# Patient Record
Sex: FEMALE | Race: WHITE | NOT HISPANIC OR LATINO | Employment: OTHER | URBAN - METROPOLITAN AREA
[De-identification: names, ages, dates, MRNs, and addresses within clinical notes are randomized per-mention and may not be internally consistent; named-entity substitution may affect disease eponyms.]

---

## 2022-08-04 PROBLEM — I10 HYPERTENSION: Status: ACTIVE | Noted: 2022-08-04

## 2022-08-04 PROBLEM — J44.9 COPD (CHRONIC OBSTRUCTIVE PULMONARY DISEASE) (HCC): Status: ACTIVE | Noted: 2022-08-04

## 2022-08-04 PROBLEM — C44.91 BASAL CELL CARCINOMA: Status: ACTIVE | Noted: 2022-06-10

## 2022-08-05 ENCOUNTER — CLINICAL SUPPORT (OUTPATIENT)
Dept: RADIATION ONCOLOGY | Facility: CLINIC | Age: 87
End: 2022-08-05
Attending: RADIOLOGY
Payer: MEDICARE

## 2022-08-05 VITALS
HEIGHT: 65 IN | RESPIRATION RATE: 16 BRPM | HEART RATE: 80 BPM | DIASTOLIC BLOOD PRESSURE: 88 MMHG | TEMPERATURE: 97.7 F | SYSTOLIC BLOOD PRESSURE: 142 MMHG | OXYGEN SATURATION: 93 % | WEIGHT: 129.41 LBS | BODY MASS INDEX: 21.56 KG/M2

## 2022-08-05 DIAGNOSIS — C44.311 BASAL CELL CARCINOMA (BCC) OF SKIN OF NOSE: Primary | ICD-10-CM

## 2022-08-05 DIAGNOSIS — C44.311 BASAL CELL CARCINOMA (BCC) OF RIGHT SIDE OF NOSE: ICD-10-CM

## 2022-08-05 PROCEDURE — 99211 OFF/OP EST MAY X REQ PHY/QHP: CPT | Performed by: RADIOLOGY

## 2022-08-05 PROCEDURE — 99204 OFFICE O/P NEW MOD 45 MIN: CPT | Performed by: RADIOLOGY

## 2022-08-05 NOTE — PROGRESS NOTES
Consultation - Radiation Oncology      STW:[id number] : 1930  Encounter: [id number]  Patient Information: [address] [place] [address]  Chief Complaint   Patient presents with    Consult     Cancer Staging  No matching staging information was found for the patient  History of Present Illness     Genevia Fleischer 1930 is a 80 y o  female Is seen today to discuss radiation for basal cell carcinoma of the nose  Referred by Dr Kindra Henry       Patient presented to [facility], [facility] on 6/10/22  Seen by Dr Chun Chapin for growth on the right side of her nose  Exam showed 1 5 cm pearly nodule on the right nasal labial fold and defined pearly plaque, 1 3 cm on the left distal nose  Shave biopsies were performed of these 2 areas revealing basal cell carcinoma          6/10/22 OUTSIDE PATHOLOGY - slides requested     1  Skin, Right nasal labial fold, shave: Basal cell carcinoma, nodular type, extending to the reticular dermis and deep margin (pTx,Mx)     2  Skin, Left distal nose, Shave: nodular and infiltrative types, extending to the deep margin (pTxMx)        Patient currently resides at [facility]   Oncology History   Basal cell carcinoma   6/10/2022 Initial Diagnosis    Basal cell carcinoma     6/10/2022 Biopsy    OUTSIDE PATHOLOGY    1  Skin, Right nasal labial fold, shave: Basal cell carcinoma, nodular type, extending to the reticular dermis and deep margin (pTx,Mx)    2   Skin, Left distal nose, Shave: nodular and infiltrative types, extending to the deep margin (pTxMx)           Past Medical History:   Diagnosis Date    Basal cell carcinoma 06/10/2022    COPD (chronic obstructive pulmonary disease) (HCC)     Diverticulitis     Hypertension      Past Surgical History:   Procedure Laterality Date    ABDOMINAL AORTIC ANEURYSM REPAIR      APPENDECTOMY      BOWEL RESECTION       SECTION      x2    TONSILLECTOMY      TOTAL HIP ARTHROPLASTY Right        Family History   Problem Relation Age of Onset    Skin cancer Son         [facility]       Social History   Social History     Substance and Sexual Activity   Alcohol Use Yes    Comment: 5-7 glasses of rum/lemonade per week     Social History     Substance and Sexual Activity   Drug Use Never     Social History     Tobacco Use   Smoking Status Former Smoker   Smokeless Tobacco Never Used         Meds/Allergies     Current Outpatient Medications:     ASPIRIN 81 PO, Take by mouth in the morning, Disp: , Rfl:     clopidogrel (PLAVIX) 75 mg tablet, Take 75 mg by mouth daily, Disp: , Rfl:     lisinopril (ZESTRIL) 5 mg tablet, Take 5 mg by mouth daily, Disp: , Rfl:   Allergies   Allergen Reactions    Bactrim [Sulfamethoxazole-Trimethoprim] Diarrhea and Other (See Comments)     Gave her diarrhea with C Diff    Clindamycin Diarrhea    Oxycodone Vomiting    Levaquin [Levofloxacin] Rash    Vancomycin Rash         Review of Systems   Constitutional: Positive for appetite change (feels she doesn get appetizing food at the assisted living facility)  HENT: Positive for hearing loss  Eyes: Negative  Wears glasses   Respiratory: Negative for shortness of breath  Uses oxygen 3-4 L continuous     Gastrointestinal: Negative  Endocrine: Negative  Genitourinary: Negative  Musculoskeletal: Positive for gait problem (unsteady, uses walker in her room, and wheelchair for distance)  Skin:        Lesion noted at right nasal labial fold   Allergic/Immunologic: Negative  Negative for environmental allergies and food allergies  Hematological: Negative      Psychiatric/Behavioral: Negative            OBJECTIVE:   /88 (BP Location: Right arm, Patient Position: Sitting)   Pulse 80   Temp 97 7 °F (36 5 °C) (Temporal)   Resp 16   Ht 5' 5" (1 651 m)   Wt 58 7 kg (129 lb 6 6 oz)   SpO2 93% Comment: 4 L/min  BMI 21 53 kg/m²   Pain Assessment:  0  Performance Status: ECOG/Zubrod/WHO: 2 - Symptomatic, <50% confined to bed    Physical Exam   She has a protruding lesion measuring 2 cm in the region of her right nasolabial fold extending into her right medial cheek  There is a central cavitation with raised borders  There is an area measuring approximately 1 5 cm at the tip of her nose just flush at the site of her biopsy  No parotid or cervical adenopathy noted  She is hard of hearing however converses appropriately  She requires 3-4 L of nasal oxygen  She is here in a wheelchair      RESULTS  Lab Results    Chemistry    No results found for: NA, K, CL, CO2, BUN, CREATININE No results found for: CALCIUM, ALKPHOS, AST, ALT, BILITOT         No results found for: WBC, HGB, HCT, MCV, PLT      Imaging Studies  No results found  Pathology:  Basal cell CA from the right nasal Ala as well as the left distal nose        ASSESSMENT  1  Basal cell carcinoma (BCC) of skin of nose     2  Basal cell carcinoma (BCC) of right side of nose  Ambulatory Referral to Radiation Oncology     Cancer Staging  No matching staging information was found for the patient  PLAN/DISCUSSION  No orders of the defined types were placed in this encounter  Awilda Pineda is a 80y o  year old female with 2 basal cell carcinomas on her nose  The lesion the right nasal labial fold extending to the inner cheek is larger in size and had been bleeding  There is no current discharge  Second biopsy proven basal cell on the tip of her nose  She is here with her son today and we discussed treatment options  He has already met with the Mohs surgeon who does not feel she is a good surgical candidate for Mohs or excision which would require graft  As these areas would encompass a larger field in a sensitive area along with presence of cartilage she would be best served with more standard fractionation be to minimize side effects    The patient also is on a blood thinner and has states that it has been difficult to control her bleeding from the lesion in the past   We discussed a course of definitive electron beam radiation treatment  We discussed the goal of treatment which is curative  In addition possible side effects which can include fatigue, skin erythema, desquamation, nasal congestion type sensation, mucosal irritation including nose bleeds  We discussed the use of nasal inserts to minimize mucosal side effects  As she is oxygen dependent at the time of simulation will construct a device for her to receive oral oxygenation  They would like to proceed with treatment and CT simulation has been scheduled for her  Leland Hunt MD  8/5/2022,3:12 PM      Portions of the record may have been created with voice recognition software  Occasional wrong word or "sound a like" substitutions may have occurred due to the inherent limitations of voice recognition software  Read the chart carefully and recognize, using context, where substitutions have occurred

## 2022-08-05 NOTE — PROGRESS NOTES
Suzanne Hebert 9/21/1930 is a 80 y o  female Is seen today to discuss radiation for basal cell carcinoma of the nose  Referred by Dr Kera Jones  Patient presented to [facility], [facility] on 6/10/22  Seen by Dr Radha De Leon for growth on the right side of her nose  Exam showed 1 5 cm pearly nodule on the right nasal labial fold and defined pearly plaque, 1 3 cm on the left distal nose  Shave biopsies were performed of these 2 areas revealing basal cell carcinoma  6/10/22 OUTSIDE PATHOLOGY - slides requested    1  Skin, Right nasal labial fold, shave: Basal cell carcinoma, nodular type, extending to the reticular dermis and deep margin (pTx,Mx)    2  Skin, Left distal nose, Shave: nodular and infiltrative types, extending to the deep margin (pTxMx)      Patient currently resides at [facility]   Basal cell carcinoma   6/10/2022 Initial Diagnosis    Basal cell carcinoma     6/10/2022 Biopsy    OUTSIDE PATHOLOGY    2  Skin, Right nasal labial fold, shave: Basal cell carcinoma, nodular type, extending to the reticular dermis and deep margin (pTx,Mx)    2  Skin, Left distal nose, Shave: nodular and infiltrative types, extending to the deep margin (pTxMx)         Review of Systems:  Review of Systems   Constitutional: Positive for appetite change (feels she doesn get appetizing food at the assisted living facility)  HENT: Positive for hearing loss  Eyes: Negative  Wears glasses   Respiratory: Negative for shortness of breath  Uses oxygen 3-4 L continuous     Gastrointestinal: Negative  Endocrine: Negative  Genitourinary: Negative  Musculoskeletal: Positive for gait problem (unsteady, uses walker in her room, and wheelchair for distance)  Skin:        Lesion noted at right nasal labial fold   Allergic/Immunologic: Negative  Negative for environmental allergies and food allergies  Hematological: Negative  Psychiatric/Behavioral: Negative  Clinical Trial: no    Pregnancy test needed:  no    ONCOTYPE/MAMMOPRINT results: n/a    PFT: n/a    Prior Radiation: No     Teaching: NCI RT packet given; RT for BCC of the nose reviewed with patient and her son, Rachelle Barrera    MST: completed    Implantable Devices (Port, Pacemaker, pain stimulator): No     Hip Replacement: R hip     Covid Vaccine Status:  Up to date    [unfilled]  Health Maintenance   Topic Date Due    Medicare Annual Wellness Visit (AWV)  Never done    COVID-19 Vaccine (1) Never done    Pneumococcal Vaccine: 65+ Years (1 - PCV) Never done    Depression Screening  Never done    BMI: Adult  Never done    Fall Risk  Never done    Influenza Vaccine (1) 09/01/2022    HIB Vaccine  Aged Out    Hepatitis B Vaccine  Aged Out    IPV Vaccine  Aged Out    Hepatitis A Vaccine  Aged Out    Meningococcal ACWY Vaccine  Aged Out    HPV Vaccine  Aged Out     Past Medical History:   Diagnosis Date    Basal cell carcinoma 06/10/2022    COPD (chronic obstructive pulmonary disease) ([facility] [address] )     Hypertension      Past Surgical History:   Procedure Laterality Date    ABDOMINAL AORTIC ANEURYSM REPAIR       No family history on file  Current Outpatient Medications:     ASPIRIN 81 PO, Take by mouth in the morning, Disp: , Rfl:     clopidogrel (PLAVIX) 75 mg tablet, Take 75 mg by mouth daily, Disp: , Rfl:     lisinopril (ZESTRIL) 5 mg tablet, Take 5 mg by mouth daily, Disp: , Rfl:   Not on File   There were no vitals filed for this visit

## 2022-08-10 ENCOUNTER — TELEPHONE (OUTPATIENT)
Dept: RADIATION ONCOLOGY | Facility: CLINIC | Age: 87
End: 2022-08-10

## 2022-09-13 ENCOUNTER — APPOINTMENT (OUTPATIENT)
Dept: RADIOLOGY | Facility: MEDICAL CENTER | Age: 87
End: 2022-09-13
Payer: MEDICARE

## 2022-09-13 DIAGNOSIS — M79.89 SWELLING OF RIGHT FOOT: ICD-10-CM

## 2022-09-13 DIAGNOSIS — M79.671 PAIN OF RIGHT FOOT: ICD-10-CM

## 2022-09-13 PROCEDURE — 73630 X-RAY EXAM OF FOOT: CPT

## 2022-09-15 ENCOUNTER — LAB REQUISITION (OUTPATIENT)
Dept: LAB | Facility: HOSPITAL | Age: 87
End: 2022-09-15
Payer: COMMERCIAL

## 2022-09-15 DIAGNOSIS — D64.9 ANEMIA, UNSPECIFIED: ICD-10-CM

## 2022-09-15 DIAGNOSIS — N18.9 CHRONIC KIDNEY DISEASE, UNSPECIFIED: ICD-10-CM

## 2022-09-15 LAB
ANION GAP SERPL CALCULATED.3IONS-SCNC: 6 MMOL/L (ref 4–13)
BASOPHILS # BLD AUTO: 0 THOUSANDS/ΜL (ref 0–0.1)
BASOPHILS NFR BLD AUTO: 0 % (ref 0–1)
BUN SERPL-MCNC: 12 MG/DL (ref 5–25)
CALCIUM SERPL-MCNC: 8.2 MG/DL (ref 8.3–10.1)
CHLORIDE SERPL-SCNC: 107 MMOL/L (ref 96–108)
CO2 SERPL-SCNC: 26 MMOL/L (ref 21–32)
CREAT SERPL-MCNC: 0.74 MG/DL (ref 0.6–1.3)
EOSINOPHIL # BLD AUTO: 0.01 THOUSAND/ΜL (ref 0–0.61)
EOSINOPHIL NFR BLD AUTO: 0 % (ref 0–6)
ERYTHROCYTE [DISTWIDTH] IN BLOOD BY AUTOMATED COUNT: 13.1 % (ref 11.6–15.1)
GFR SERPL CREATININE-BSD FRML MDRD: 71 ML/MIN/1.73SQ M
GLUCOSE SERPL-MCNC: 129 MG/DL (ref 65–140)
HCT VFR BLD AUTO: 33 % (ref 34.8–46.1)
HGB BLD-MCNC: 10.3 G/DL (ref 11.5–15.4)
IMM GRANULOCYTES # BLD AUTO: 0.02 THOUSAND/UL (ref 0–0.2)
IMM GRANULOCYTES NFR BLD AUTO: 0 % (ref 0–2)
LYMPHOCYTES # BLD AUTO: 1.46 THOUSANDS/ΜL (ref 0.6–4.47)
LYMPHOCYTES NFR BLD AUTO: 17 % (ref 14–44)
MCH RBC QN AUTO: 28.8 PG (ref 26.8–34.3)
MCHC RBC AUTO-ENTMCNC: 31.2 G/DL (ref 31.4–37.4)
MCV RBC AUTO: 92 FL (ref 82–98)
MONOCYTES # BLD AUTO: 0.84 THOUSAND/ΜL (ref 0.17–1.22)
MONOCYTES NFR BLD AUTO: 10 % (ref 4–12)
NEUTROPHILS # BLD AUTO: 6.12 THOUSANDS/ΜL (ref 1.85–7.62)
NEUTS SEG NFR BLD AUTO: 73 % (ref 43–75)
NRBC BLD AUTO-RTO: 0 /100 WBCS
PLATELET # BLD AUTO: 280 THOUSANDS/UL (ref 149–390)
PMV BLD AUTO: 11.2 FL (ref 8.9–12.7)
POTASSIUM SERPL-SCNC: 4.4 MMOL/L (ref 3.5–5.3)
RBC # BLD AUTO: 3.58 MILLION/UL (ref 3.81–5.12)
SODIUM SERPL-SCNC: 139 MMOL/L (ref 135–147)
WBC # BLD AUTO: 8.45 THOUSAND/UL (ref 4.31–10.16)

## 2022-09-15 PROCEDURE — 84550 ASSAY OF BLOOD/URIC ACID: CPT | Performed by: INTERNAL MEDICINE

## 2022-09-15 PROCEDURE — 85025 COMPLETE CBC W/AUTO DIFF WBC: CPT | Performed by: INTERNAL MEDICINE

## 2022-09-15 PROCEDURE — 80048 BASIC METABOLIC PNL TOTAL CA: CPT | Performed by: INTERNAL MEDICINE

## 2022-09-16 LAB — URATE SERPL-MCNC: 4 MG/DL (ref 2–7.5)
